# Patient Record
Sex: FEMALE | Race: WHITE
[De-identification: names, ages, dates, MRNs, and addresses within clinical notes are randomized per-mention and may not be internally consistent; named-entity substitution may affect disease eponyms.]

---

## 2017-01-25 ENCOUNTER — HOSPITAL ENCOUNTER (OUTPATIENT)
Dept: HOSPITAL 80 - BMCIMAGING | Age: 72
End: 2017-01-25
Attending: GENERAL ACUTE CARE HOSPITAL
Payer: COMMERCIAL

## 2017-01-25 DIAGNOSIS — Z12.31: Primary | ICD-10-CM

## 2017-01-25 DIAGNOSIS — Z92.3: ICD-10-CM

## 2017-01-25 DIAGNOSIS — Z85.3: ICD-10-CM

## 2017-01-25 PROCEDURE — G0202 SCR MAMMO BI INCL CAD: HCPCS

## 2017-01-25 NOTE — MA
Screening Digital Mammogram With iCAD Analysis



Clinical Indications: Routine screening. Patient has had left breast cancer treated with lumpectomy a
nd radiation. 



Technique: Standard cephalocaudal and mediolateral oblique projections were obtained. This examinatio
n was processed by the iCAD computer aided detection system.



Comparison: January 2016, January 2015, December 2013, December 2012, November 2011, November 2010, O
ctober 2009



Breast density: Type C; Heterogeneously dense.



Findings: CAD was reviewed. Architectural change at the left breast lumpectomy site is stable.  No ma
sses, suspicious calcifications or other signs of malignancy are identified.  There has been no signi
ficant change in the appearance of either breast.



Impression: Benign post lumpectomy mammography, BI-RADS 2. 



Recommendation: Routine mammographic screening in one year as long as physical examination is negativ
[facility].



Dense breast parenchyma diminishes mammographic sensitivity.

Negative mammography should not preclude additional workup of a clinically suspicious finding.

The patient's information is entered into a reminder system with a target due date for her next mammo
gram.

## 2018-02-07 ENCOUNTER — HOSPITAL ENCOUNTER (OUTPATIENT)
Dept: HOSPITAL 80 - BMCIMAGING | Age: 73
End: 2018-02-07
Attending: FAMILY MEDICINE
Payer: COMMERCIAL

## 2018-02-07 DIAGNOSIS — Z12.31: Primary | ICD-10-CM

## 2018-02-07 DIAGNOSIS — Z85.3: ICD-10-CM

## 2018-03-19 ENCOUNTER — HOSPITAL ENCOUNTER (EMERGENCY)
Dept: HOSPITAL 80 - FED | Age: 73
Discharge: HOME | End: 2018-03-19
Payer: COMMERCIAL

## 2018-03-19 VITALS
OXYGEN SATURATION: 97 % | SYSTOLIC BLOOD PRESSURE: 118 MMHG | HEART RATE: 75 BPM | DIASTOLIC BLOOD PRESSURE: 74 MMHG | TEMPERATURE: 98.1 F

## 2018-03-19 VITALS — RESPIRATION RATE: 16 BRPM
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
## 2018-03-19 DIAGNOSIS — Z87.891: ICD-10-CM

## 2018-03-19 DIAGNOSIS — S42.474A: Primary | ICD-10-CM

## 2018-03-19 NOTE — EDPHY
General





- History


Smoking Status: Former smoker


Time Seen by Provider: 03/19/18 12:51


Narrative: 





CHIEF COMPLAINT: 


Fall, right elbow pain





HISTORY OF PRESENT ILLNESS: 


Patient complains of right elbow pain status post fall.  She was walking 

outside earlier today when she slipped on the ice.  She landed on her right hip 

and right elbow.  She said she may have struck her head on the right side but 

has no loss of consciousness or headache.  Her pain is only in the right elbow.

  It is severe.  It is worse with any kind of palpation and movement.  It is 

located on the entire elbow.  No numbness or tingling.  No weakness.  No pain 

in the right hand or wrist.  No pain in the right shoulder.  She did strike her 

right hip and was minimally painful earlier but that has resolved.  She is able 

to walk on it without any pain.  Abdominal, chest or back pain.  No other 

associated complaints or modifying factors.  She does not take any anti coag.  

Right-hand dominant





NPO as of 8:30 a.m. Today





REVIEW OF SYSTEMS:


Ten systems reviewed and are negative unless otherwise noted in the HPI





PCP:


Dr. Sun Kitchen





SPECIALISTS:


None





PAST MEDICAL HISTORY: 


None





PAST SURGICAL HISTORY:


No recent surgeries





SOCIAL HISTORY:


Remote smoking history.  No drug or alcohol use.  Lives and works here locally.

  She has a 





FAMILY HISTORY:


Noncontributory





EXAMINATION


General Appearance:  Alert, no distress


Head: normocephalic, atraumatic


Eyes:  Pupils equal and round, no conjunctival pallor or injection


ENT, Mouth:  Mucous membranes moist


Neck:  Normal inspection, supple, non-tender


Respiratory:  Lungs are clear to auscultation.  No wheezing, rhonchi or crackles


Cardiovascular:  Regular rate and rhythm.  No murmur.  Symmetric radial pulses 2

+.  Brisk cap refill in the fingers of the right hand.


Gastrointestinal:  Abdomen is soft and nontender


Back: non-tender, no bony abnormalities.  No crepitus or deformity.


Neurological:  A&O, nonfocal, sensation to the back of the hand, palmar surface 

of the hand and median distribution is symmetric.  No wrist drop.  Good 

strength of the interossei symmetrically


Skin:  Warm and dry, no rash.  No erythema.  No petechiae or purpura.  No 

laceration or puncture


Extremities:  Tenderness of the right elbow at the distal humerus.  There is no 

tenderness of the right shoulder, wrist or phalanges of the right hand.  Range 

of motion of the elbow not able to be tested


Psychiatric:  Mood and affect normal





DIFFERENTIAL DIAGNOSES:


Including but not limited to fracture, sprain, strain, dislocation, subluxation








MDM:


1:00 p.m.


Mechanical fall at 10:00 a.m. This morning with the right, close transverse 

intracranial or fracture of the humerus.  She is neuro intact distally with 

significant pain.  No wrist drop.  No laceration or puncture.  No injury to the 

shoulder or wrist to the right arm.  No head injury.  This is the radiologist 

interpretation I will consult Orthopedics for the recommendation.  I have 

reviewed the radiology findings with the patient and showed her x-rays.





1:20 p.m.


Case discussed with orthopedist Dr. Berrios.  He recommends posterior long-arm 

splint with sling.  He would like to see the patient in his office this week.





1:30 p.m.


I have re-evaluated the patient and informed her of this.  She is currently 

receiving Percocet at this time.  Splint and Sling will be placed.





2:10 p.m.


Posterior long-arm splint and sling has been placed.  She has tolerated this 

and feels better with the splint in place.  She is asking me to evaluate right 

hip at this time.  She says she has no pain on and no pain when she walks she 

would like me to look at it. I have done so in appreciate no abnormality.  

There is no bruising, abrasion laceration.  There is no tenderness of the hip 

or greater trochanter.  She is fully ambulatory on this.  At this point she 

will be discharged home with instructions to contact the orthopedist Dr. Berrios.  

She has nonweightbearing instructions.  She has Percocet prescription as 

needed.  She has strict ED precautions and she is comfortable this plan.  She 

is neurovascular intact post splint procedure and discharged home stable 

condition.








SUPERVISION:


Patient was independently examined, but I discussed the case with my secondary 

supervising physician Dr. King





Ortho consult by telephone:  Dr. Berrios ([facility])


Medical Decision Making: 





I did not see this patient while she was in the emergency department.  However 

her care was discussed with the PA while the patient was in the department.  I 

agree with treatment plan and management (Jovan King)





- Diagnostics


Imaging Results: 





 Imaging Impressions





Elbow X-Ray  03/19/18 11:55


Impression: Transverse nondisplaced intercondylar distal right humeral fracture.














- Objective


Vital Signs: 





 Initial Vital Signs











Temperature (C)  36.4 C   03/19/18 11:51


 


Heart Rate  89   03/19/18 11:51


 


Respiratory Rate  16   03/19/18 11:51


 


Blood Pressure  145/65 H  03/19/18 11:51


 


O2 Sat (%)  99   03/19/18 11:51








 











O2 Delivery Mode               Room Air














Allergies/Adverse Reactions: 


 





No Known Allergies Allergy (Unverified 03/19/18 11:51)


 








Home Medications: 














 Medication  Instructions  Recorded


 


oxyCODONE HCL/ACETAMINOPHEN 1 each PO Q4-6PRN PRN #15 tablet 03/19/18





[Percocet 5-325 mg Tablet]  











Medications Given: 





 








Discontinued Medications





Ondansetron HCl (Zofran Odt)  4 mg PO EDNOW ONE


   Stop: 03/19/18 13:26


   Last Admin: 03/19/18 13:43 Dose:  4 mg


Oxycodone/Acetaminophen (Percocet 5/325)  1 tab PO EDNOW ONE


   Stop: 03/19/18 13:26


   Last Admin: 03/19/18 13:43 Dose:  1 tab








Departure





- Departure


Disposition: Home, Routine, Self-Care


Clinical Impression: 


Nondisplaced transcondylar fracture of humerus


Qualifiers:


 Encounter type: initial encounter Fracture type: closed Laterality: right 

Qualified Code(s): S42.474A - Nondisplaced transcondylar fracture of right 

humerus, initial encounter for closed fracture





Condition: Good


Instructions:  Open Reduction and Internal Fixation of an Elbow Fracture in 

Children (ED), Elbow Fracture (ED)


Additional Instructions: 


1. Nonweightbearing on the right upper extremity until seen by orthopedist


2. Splint and sling until seen by orthopedist


3. Contact the on-call orthopedist Dr. Berrios today for outpatient care


4. Pain medication as prescribed as needed


5. ED precautions as discussed


Referrals: 


Sun Kitchen MD [Primary Care Provider] - As per Instructions


Marlyn Berrios MD [Medical Doctor] - As per Instructions


Prescriptions: 


oxyCODONE HCL/ACETAMINOPHEN [Percocet 5-325 mg Tablet] 1 each PO Q4-6PRN PRN #

15 tablet


 PRN Reason: Pain, Breakthrough

## 2019-02-19 ENCOUNTER — HOSPITAL ENCOUNTER (OUTPATIENT)
Dept: HOSPITAL 80 - BMCIMAGING | Age: 74
End: 2019-02-19
Attending: FAMILY MEDICINE
Payer: COMMERCIAL

## 2019-02-19 DIAGNOSIS — Z85.3: ICD-10-CM

## 2019-02-19 DIAGNOSIS — Z12.31: Primary | ICD-10-CM

## 2019-02-21 ENCOUNTER — HOSPITAL ENCOUNTER (OUTPATIENT)
Dept: HOSPITAL 80 - BMCIMAGING | Age: 74
End: 2019-02-21
Attending: FAMILY MEDICINE
Payer: COMMERCIAL

## 2019-02-21 DIAGNOSIS — R92.8: Primary | ICD-10-CM

## 2019-02-21 DIAGNOSIS — Z86.000: ICD-10-CM

## 2019-04-03 ENCOUNTER — HOSPITAL ENCOUNTER (OUTPATIENT)
Dept: HOSPITAL 80 - BMCIMAGING | Age: 74
End: 2019-04-03
Attending: EMERGENCY MEDICINE
Payer: COMMERCIAL

## 2019-04-03 DIAGNOSIS — S49.92XA: Primary | ICD-10-CM
